# Patient Record
Sex: FEMALE | Race: WHITE | Employment: FULL TIME | URBAN - METROPOLITAN AREA
[De-identification: names, ages, dates, MRNs, and addresses within clinical notes are randomized per-mention and may not be internally consistent; named-entity substitution may affect disease eponyms.]

---

## 2022-11-12 ENCOUNTER — APPOINTMENT (OUTPATIENT)
Dept: CT IMAGING | Age: 27
End: 2022-11-12
Payer: COMMERCIAL

## 2022-11-12 ENCOUNTER — HOSPITAL ENCOUNTER (EMERGENCY)
Age: 27
Discharge: HOME | End: 2022-11-12
Attending: EMERGENCY MEDICINE
Payer: COMMERCIAL

## 2022-11-12 VITALS
OXYGEN SATURATION: 100 % | TEMPERATURE: 97.8 F | SYSTOLIC BLOOD PRESSURE: 118 MMHG | WEIGHT: 127 LBS | BODY MASS INDEX: 21.68 KG/M2 | DIASTOLIC BLOOD PRESSURE: 68 MMHG | HEART RATE: 58 BPM | RESPIRATION RATE: 14 BRPM | HEIGHT: 64 IN

## 2022-11-12 DIAGNOSIS — K80.00 CALCULUS OF GALLBLADDER WITH ACUTE CHOLECYSTITIS WITHOUT OBSTRUCTION: Primary | ICD-10-CM

## 2022-11-12 LAB
A/G RATIO: 1.3 (ref 1.1–2.2)
ALBUMIN SERPL-MCNC: 4.3 G/DL (ref 3.4–5)
ALP BLD-CCNC: 45 U/L (ref 40–129)
ALT SERPL-CCNC: 16 U/L (ref 10–40)
ANION GAP SERPL CALCULATED.3IONS-SCNC: 13 MMOL/L (ref 3–16)
AST SERPL-CCNC: 24 U/L (ref 15–37)
BASOPHILS ABSOLUTE: 0.1 K/UL (ref 0–0.2)
BASOPHILS RELATIVE PERCENT: 1.2 %
BILIRUB SERPL-MCNC: <0.2 MG/DL (ref 0–1)
BILIRUBIN URINE: NEGATIVE
BLOOD, URINE: NEGATIVE
BUN BLDV-MCNC: 12 MG/DL (ref 7–20)
CALCIUM SERPL-MCNC: 9.4 MG/DL (ref 8.3–10.6)
CHLORIDE BLD-SCNC: 105 MMOL/L (ref 99–110)
CLARITY: CLEAR
CO2: 21 MMOL/L (ref 21–32)
COLOR: YELLOW
CREAT SERPL-MCNC: 0.6 MG/DL (ref 0.6–1.1)
EOSINOPHILS ABSOLUTE: 0.2 K/UL (ref 0–0.6)
EOSINOPHILS RELATIVE PERCENT: 2.7 %
GFR SERPL CREATININE-BSD FRML MDRD: >60 ML/MIN/{1.73_M2}
GLUCOSE BLD-MCNC: 140 MG/DL (ref 70–99)
GLUCOSE URINE: NEGATIVE MG/DL
HCG(URINE) PREGNANCY TEST: NEGATIVE
HCT VFR BLD CALC: 37.2 % (ref 36–48)
HEMOGLOBIN: 12.4 G/DL (ref 12–16)
KETONES, URINE: NEGATIVE MG/DL
LEUKOCYTE ESTERASE, URINE: NEGATIVE
LIPASE: 37 U/L (ref 13–60)
LYMPHOCYTES ABSOLUTE: 2.6 K/UL (ref 1–5.1)
LYMPHOCYTES RELATIVE PERCENT: 38.5 %
MCH RBC QN AUTO: 25.5 PG (ref 26–34)
MCHC RBC AUTO-ENTMCNC: 33.3 G/DL (ref 31–36)
MCV RBC AUTO: 76.5 FL (ref 80–100)
MICROSCOPIC EXAMINATION: NORMAL
MONOCYTES ABSOLUTE: 0.5 K/UL (ref 0–1.3)
MONOCYTES RELATIVE PERCENT: 8.1 %
NEUTROPHILS ABSOLUTE: 3.3 K/UL (ref 1.7–7.7)
NEUTROPHILS RELATIVE PERCENT: 49.5 %
NITRITE, URINE: NEGATIVE
PDW BLD-RTO: 21.1 % (ref 12.4–15.4)
PH UA: 6 (ref 5–8)
PLATELET # BLD: 331 K/UL (ref 135–450)
PMV BLD AUTO: 7.7 FL (ref 5–10.5)
POTASSIUM REFLEX MAGNESIUM: 4 MMOL/L (ref 3.5–5.1)
PROTEIN UA: NEGATIVE MG/DL
RBC # BLD: 4.86 M/UL (ref 4–5.2)
SODIUM BLD-SCNC: 139 MMOL/L (ref 136–145)
SPECIFIC GRAVITY UA: <=1.005 (ref 1–1.03)
TOTAL PROTEIN: 7.6 G/DL (ref 6.4–8.2)
URINE TYPE: NORMAL
UROBILINOGEN, URINE: 0.2 E.U./DL
WBC # BLD: 6.7 K/UL (ref 4–11)

## 2022-11-12 PROCEDURE — 6360000004 HC RX CONTRAST MEDICATION: Performed by: EMERGENCY MEDICINE

## 2022-11-12 PROCEDURE — 96374 THER/PROPH/DIAG INJ IV PUSH: CPT

## 2022-11-12 PROCEDURE — 84703 CHORIONIC GONADOTROPIN ASSAY: CPT

## 2022-11-12 PROCEDURE — 81003 URINALYSIS AUTO W/O SCOPE: CPT

## 2022-11-12 PROCEDURE — 2580000003 HC RX 258: Performed by: EMERGENCY MEDICINE

## 2022-11-12 PROCEDURE — 6360000002 HC RX W HCPCS: Performed by: EMERGENCY MEDICINE

## 2022-11-12 PROCEDURE — 74177 CT ABD & PELVIS W/CONTRAST: CPT

## 2022-11-12 PROCEDURE — 85025 COMPLETE CBC W/AUTO DIFF WBC: CPT

## 2022-11-12 PROCEDURE — 83690 ASSAY OF LIPASE: CPT

## 2022-11-12 PROCEDURE — 80053 COMPREHEN METABOLIC PANEL: CPT

## 2022-11-12 PROCEDURE — 99285 EMERGENCY DEPT VISIT HI MDM: CPT

## 2022-11-12 RX ORDER — 0.9 % SODIUM CHLORIDE 0.9 %
1000 INTRAVENOUS SOLUTION INTRAVENOUS ONCE
Status: COMPLETED | OUTPATIENT
Start: 2022-11-12 | End: 2022-11-12

## 2022-11-12 RX ORDER — IBUPROFEN 600 MG/1
600 TABLET ORAL EVERY 8 HOURS PRN
Qty: 15 TABLET | Refills: 0 | Status: SHIPPED | OUTPATIENT
Start: 2022-11-12 | End: 2022-11-17

## 2022-11-12 RX ORDER — ONDANSETRON 4 MG/1
4 TABLET, ORALLY DISINTEGRATING ORAL EVERY 8 HOURS PRN
Qty: 10 TABLET | Refills: 0 | Status: SHIPPED | OUTPATIENT
Start: 2022-11-12

## 2022-11-12 RX ORDER — DICYCLOMINE HYDROCHLORIDE 10 MG/1
10 CAPSULE ORAL EVERY 6 HOURS PRN
Qty: 20 CAPSULE | Refills: 0 | Status: SHIPPED | OUTPATIENT
Start: 2022-11-12

## 2022-11-12 RX ORDER — ONDANSETRON 2 MG/ML
4 INJECTION INTRAMUSCULAR; INTRAVENOUS ONCE
Status: COMPLETED | OUTPATIENT
Start: 2022-11-12 | End: 2022-11-12

## 2022-11-12 RX ADMIN — SODIUM CHLORIDE 1000 ML: 9 INJECTION, SOLUTION INTRAVENOUS at 18:53

## 2022-11-12 RX ADMIN — IOHEXOL 100 ML: 350 INJECTION, SOLUTION INTRAVENOUS at 18:49

## 2022-11-12 RX ADMIN — ONDANSETRON 4 MG: 2 INJECTION INTRAMUSCULAR; INTRAVENOUS at 18:53

## 2022-11-12 ASSESSMENT — ENCOUNTER SYMPTOMS
STRIDOR: 0
COLOR CHANGE: 0
VOMITING: 0
FACIAL SWELLING: 0
VOICE CHANGE: 0
ABDOMINAL PAIN: 1
WHEEZING: 0
TROUBLE SWALLOWING: 0
NAUSEA: 0
SHORTNESS OF BREATH: 0

## 2022-11-12 ASSESSMENT — PAIN DESCRIPTION - ORIENTATION: ORIENTATION: RIGHT;UPPER

## 2022-11-12 ASSESSMENT — PAIN DESCRIPTION - PAIN TYPE: TYPE: ACUTE PAIN

## 2022-11-12 ASSESSMENT — PAIN - FUNCTIONAL ASSESSMENT: PAIN_FUNCTIONAL_ASSESSMENT: 0-10

## 2022-11-12 ASSESSMENT — PAIN DESCRIPTION - FREQUENCY: FREQUENCY: CONTINUOUS

## 2022-11-12 ASSESSMENT — PAIN DESCRIPTION - DESCRIPTORS: DESCRIPTORS: ACHING;CRAMPING

## 2022-11-12 ASSESSMENT — PAIN DESCRIPTION - LOCATION: LOCATION: ABDOMEN

## 2022-11-12 ASSESSMENT — PAIN SCALES - GENERAL: PAINLEVEL_OUTOF10: 6

## 2022-11-12 NOTE — ED NOTES
Patient to ed with complaints of right upper abd pain which started this am and worsened, patient denies nausea, vomiting or diarrhea.      Oseas Nix RN  11/12/22 [id number]

## 2022-11-13 NOTE — ED PROVIDER NOTES
[facility]  eMERGENCY dEPARTMENT eNCOUnter      Pt Name: Kaela Berman  MRN: [id number]  [place] 1995  Date of evaluation: 11/12/2022  Provider: Myranda Martins MD    [address] [place] [address]       Chief Complaint   Patient presents with    Abdominal Pain     Upper right     HISTORY OF PRESENT ILLNESS   (Location/Symptom, Timing/Onset, Context/Setting, Quality, Duration, Modifying Factors, Severity)  Note limiting factors. Kaela Berman is a 28 y.o. female who presents for right upper quadrant abdominal pain for 1 day. Patient was eating makes her pain worse. Patient denies any chest pain shortness of breath hemoptysis leg swelling or fever. Patient reports symptoms are moderate constant and worsening. HPI    Nursing Notes were reviewed. REVIEW OFSYSTEMS    (2-9 systems for level 4, 10 or more for level 5)     Review of Systems   Constitutional:  Negative for appetite change, fever and unexpected weight change. HENT:  Negative for facial swelling, trouble swallowing and voice change. Eyes:  Negative for visual disturbance. Respiratory:  Negative for shortness of breath, wheezing and stridor. Cardiovascular:  Negative for chest pain and palpitations. Gastrointestinal:  Positive for abdominal pain. Negative for nausea and vomiting. Genitourinary:  Negative for dysuria and vaginal bleeding. Musculoskeletal:  Negative for neck pain and neck stiffness. Skin:  Negative for color change and wound. Neurological:  Negative for seizures and syncope. Psychiatric/Behavioral:  Negative for self-injury and suicidal ideas. Except as noted above the remainder of the review of systems was reviewed and negative. PAST MEDICAL HISTORY   History reviewed. No pertinent past medical history. SURGICAL HISTORY     History reviewed. No pertinent surgical history.       CURRENT MEDICATIONS       Previous Medications    No medications on file       ALLERGIES     Patient has no known allergies. FAMILY HISTORY     History reviewed. No pertinent family history. SOCIAL HISTORY       Social History     Socioeconomic History    Marital status: Single     Spouse name: None    Number of children: None    Years of education: None    Highest education level: None   Tobacco Use    Smoking status: Never    Smokeless tobacco: Never   Substance and Sexual Activity    Alcohol use: Yes     Comment: social    Sexual activity: Never         PHYSICAL EXAM    (up to 7 for level 4, 8 or more for level 5)     ED Triage Vitals [11/12/22 1749]   BP Temp Temp src Heart Rate Resp SpO2 Height Weight   118/68 97.8 °F (36.6 °C) -- 58 14 100 % 5' 4\" (1.626 m) 127 lb (57.6 kg)       Physical Exam  Vitals and nursing note reviewed. Constitutional:       Appearance: She is well-developed. She is not diaphoretic. HENT:      Head: Normocephalic and atraumatic. Right Ear: External ear normal.      Left Ear: External ear normal.   Eyes:      Conjunctiva/sclera: Conjunctivae normal.   Neck:      Vascular: No JVD. Trachea: No tracheal deviation. Cardiovascular:      Rate and Rhythm: Normal rate. Pulmonary:      Effort: Pulmonary effort is normal. No respiratory distress. Breath sounds: Normal breath sounds. No wheezing. Abdominal:      General: There is no distension. Palpations: Abdomen is soft. Tenderness: There is abdominal tenderness in the right upper quadrant. There is no guarding or rebound. Comments: Isolated right upper quadrant tenderness to palpation. No rebound, guarding, peritoneal signs   Musculoskeletal:         General: No tenderness or deformity. Normal range of motion. Cervical back: Neck supple. Skin:     General: Skin is warm and dry. Neurological:      Mental Status: She is alert. Cranial Nerves: No cranial nerve deficit.        DIAGNOSTIC RESULTS     RADIOLOGY:     Interpretation per the Radiologist below, if available at the time of this note:    CT ABDOMEN PELVIS W IV CONTRAST Additional Contrast? None   Final Result      1. Mild gallbladder distention. Pericholecystic fluid is present which can be seen with cholecystitis. LABS:  Labs Reviewed   CBC WITH AUTO DIFFERENTIAL - Abnormal; Notable for the following components:       Result Value    MCV 76.5 (*)     MCH 25.5 (*)     RDW 21.1 (*)     All other components within normal limits   COMPREHENSIVE METABOLIC PANEL W/ REFLEX TO MG FOR LOW K - Abnormal; Notable for the following components:    Glucose 140 (*)     All other components within normal limits   URINALYSIS   PREGNANCY, URINE   LIPASE       All otherlabs were within normal range or not returned as of this dictation. EMERGENCY DEPARTMENT COURSE and DIFFERENTIAL DIAGNOSIS/MDM:   Vitals:    Vitals:    11/12/22 1749   BP: 118/68   Pulse: 58   Resp: 14   Temp: 97.8 °F (36.6 °C)   SpO2: 100%   Weight: 127 lb (57.6 kg)   Height: 5' 4\" (1.626 m)       MDM  Based on patient's history and physical exam primarily suspect biliary colic or other gallbladder pathology. I have discussed the possibility of ACS, PE, or other emergent pathology however symptoms are completely reproducible to palpation of the right upper quadrant and get worse with eating and therefore primarily suspect GI pathology. Laboratory evaluation shows normal white blood cell count, patient is afebrile, no emergent signs of endorgan damage such as elevated LFTs, lipase, or bilirubin. River quadrant ultrasound is not available at this facility therefore CT abdomen pelvis with contrast is obtained and does show mild gallbladder wall distention with pericholecystic fluid with possibility of cholecystitis.   I discussed these findings with the patient as well as the fact that we would likely admit if it was terminated for acute cholecystitis however as this appears to be possible but not definitive and the patient appears very well clinically it would be reasonable to either admit or have the patient follow-up with a doctor as an outpatient and give standard ER return precautions. The patient has several questions which were answered to requests the time. I discussed this with her friend and think about it. After 50 minutes of thing medical patient reports that she is strongly prefers to be discharged home. The patient is afebrile, has no signs of acute endorgan damage or fever and I feel this is appropriate. Patient is discharged home with Zofran, Bentyl, and ibuprofen for symptomatic control as well as referral to both general surgery and GI and information on low-fat diet for gallstones. Very strict ER return precautions are given for fever, uncontrolled pain, inability tolerate food or drink. Patient expresses understanding and agreement with this plan and is discharged home. Procedures    FINAL IMPRESSION      1. Calculus of gallbladder with acute cholecystitis without obstruction          DISPOSITION/PLAN   DISPOSITION Decision To Discharge 11/12/2022 07:19:42 PM      PATIENT REFERRED TO:  Kun Greer MD  [address] [address] [address]    In 2 days      Luige Prudencio [address] [facility]  [facility]    In 1 week      [address]  [phone]    If symptoms worsen    MEDICATIONS:  New Prescriptions    DICYCLOMINE (BENTYL) 10 MG CAPSULE    Take 1 capsule by mouth every 6 hours as needed (cramps)    IBUPROFEN (ADVIL;MOTRIN) 600 MG TABLET    Take 1 tablet by mouth every 8 hours as needed for Pain    ONDANSETRON (ZOFRAN ODT) 4 MG DISINTEGRATING TABLET    Take 1 tablet by mouth every 8 hours as needed for Nausea          (Please note that portions of this note were completed with a voice recognition program.  Efforts were made to edit the dictations but occasionally words aremis-transcribed. )    Janet Fragoso MD (electronically signed)  Attending Emergency Physician           Kelly Johnston MD  11/12/22 1924